# Patient Record
Sex: MALE | Race: WHITE
[De-identification: names, ages, dates, MRNs, and addresses within clinical notes are randomized per-mention and may not be internally consistent; named-entity substitution may affect disease eponyms.]

---

## 2022-09-22 ENCOUNTER — HOSPITAL ENCOUNTER (EMERGENCY)
Dept: HOSPITAL 26 - MED | Age: 82
LOS: 1 days | Discharge: HOME | End: 2022-09-23
Payer: MEDICARE

## 2022-09-22 VITALS — BODY MASS INDEX: 19.04 KG/M2 | HEIGHT: 71 IN | WEIGHT: 136 LBS

## 2022-09-22 VITALS — DIASTOLIC BLOOD PRESSURE: 73 MMHG | SYSTOLIC BLOOD PRESSURE: 112 MMHG

## 2022-09-22 DIAGNOSIS — Z95.0: ICD-10-CM

## 2022-09-22 DIAGNOSIS — N39.0: Primary | ICD-10-CM

## 2022-09-22 PROCEDURE — 81002 URINALYSIS NONAUTO W/O SCOPE: CPT

## 2022-09-22 PROCEDURE — 99283 EMERGENCY DEPT VISIT LOW MDM: CPT

## 2022-09-22 PROCEDURE — 96372 THER/PROPH/DIAG INJ SC/IM: CPT

## 2022-09-22 PROCEDURE — 87086 URINE CULTURE/COLONY COUNT: CPT

## 2022-09-22 NOTE — NUR
ASSUME CARE OF PT BY ZEINAB VILLA AT THIS TIME, PT C/O LEFT FLANK PAIN WITH 
BLOOD IN URINE, PT HAS A SUPRAPUBIC  CATHETER, PT STATES CATHETER HAS NOT BEEN 
CHANGED SINCE AUGUST. PT SAYS HE HAS A WEAK BLADDER THATS WHY HE HAS THE 
CATHETER. WAITING FOR MD EVALUATION.

## 2022-09-23 VITALS — DIASTOLIC BLOOD PRESSURE: 70 MMHG | SYSTOLIC BLOOD PRESSURE: 142 MMHG

## 2022-11-27 ENCOUNTER — HOSPITAL ENCOUNTER (EMERGENCY)
Dept: HOSPITAL 26 - MED | Age: 82
Discharge: HOME | End: 2022-11-27
Payer: MEDICARE

## 2022-11-27 VITALS — WEIGHT: 130 LBS | HEIGHT: 69 IN | BODY MASS INDEX: 19.26 KG/M2

## 2022-11-27 VITALS — SYSTOLIC BLOOD PRESSURE: 125 MMHG | DIASTOLIC BLOOD PRESSURE: 66 MMHG

## 2022-11-27 VITALS — SYSTOLIC BLOOD PRESSURE: 117 MMHG | DIASTOLIC BLOOD PRESSURE: 64 MMHG

## 2022-11-27 DIAGNOSIS — Z63.4: ICD-10-CM

## 2022-11-27 DIAGNOSIS — Z79.899: ICD-10-CM

## 2022-11-27 DIAGNOSIS — Z95.0: ICD-10-CM

## 2022-11-27 DIAGNOSIS — R44.1: Primary | ICD-10-CM

## 2022-11-27 SDOH — SOCIAL STABILITY - SOCIAL INSECURITY: DISSAPEARANCE AND DEATH OF FAMILY MEMBER: Z63.4

## 2022-11-27 NOTE — NUR
82/M BIBA FROM HOME D/T HALLUCINATION WITNESSED BY BROTHER YESTERDAY. PT 
REPORTS VISUALIZING HIS WIFE, WHO PASSED AWAY, YESTERDAY AT HOME. DENIES ANY 
COMPLAINTS AT THIS TIME, PT GCS 15, DENIES SI, VERBALLY RESPONDS TO ALL 
QUESTIONS, DENIES ANY PAIN. AAO4. EMS DENIES FALL OR TRAUMA. PT CALM AND 
COOPERATIVE. 



PMH: PACEMAKER, HYPOTHYROIDISM, HTN, AFIB.

## 2022-11-27 NOTE — NUR
SPOKE WITH AALIYAH, BROTHER, FOR PATIENT PICKUP. PER AALIYAH, ON HIS WAY TO PICK 
UP PT. PT IS AWARE.